# Patient Record
Sex: FEMALE | URBAN - METROPOLITAN AREA
[De-identification: names, ages, dates, MRNs, and addresses within clinical notes are randomized per-mention and may not be internally consistent; named-entity substitution may affect disease eponyms.]

---

## 2018-10-31 ENCOUNTER — IMPORTED ENCOUNTER (OUTPATIENT)
Dept: URBAN - METROPOLITAN AREA CLINIC 1 | Facility: CLINIC | Age: 43
End: 2018-10-31

## 2018-10-31 PROBLEM — H52.13: Noted: 2018-10-31

## 2018-10-31 PROBLEM — H52.4: Noted: 2018-10-31

## 2018-10-31 PROCEDURE — S0620 ROUTINE OPHTHALMOLOGICAL EXA: HCPCS

## 2018-10-31 NOTE — PATIENT DISCUSSION
1. Myopia: Rx was given for correction if indicated and requested. 2. PresbyopiaReturn for an appointment in 1 year 36 with Dr. Sera Mccormick.

## 2022-04-02 ASSESSMENT — VISUAL ACUITY
OS_SC: J2
OS_SC: 20/25+2
OD_SC: 20/20-2
OD_SC: J2

## 2022-04-02 ASSESSMENT — TONOMETRY
OS_IOP_MMHG: 18
OD_IOP_MMHG: 18